# Patient Record
Sex: MALE | Race: WHITE | NOT HISPANIC OR LATINO
[De-identification: names, ages, dates, MRNs, and addresses within clinical notes are randomized per-mention and may not be internally consistent; named-entity substitution may affect disease eponyms.]

---

## 2017-09-21 ENCOUNTER — APPOINTMENT (OUTPATIENT)
Dept: DERMATOLOGY | Facility: CLINIC | Age: 77
End: 2017-09-21
Payer: MEDICARE

## 2017-09-21 PROCEDURE — 17003 DESTRUCT PREMALG LES 2-14: CPT

## 2017-09-21 PROCEDURE — 17281 DSTR MAL LS F/E/E/N/L/M .6-1: CPT | Mod: 59

## 2017-09-21 PROCEDURE — 17000 DESTRUCT PREMALG LESION: CPT

## 2017-09-21 PROCEDURE — 99214 OFFICE O/P EST MOD 30 MIN: CPT | Mod: 25

## 2018-01-23 ENCOUNTER — APPOINTMENT (OUTPATIENT)
Dept: DERMATOLOGY | Facility: CLINIC | Age: 78
End: 2018-01-23

## 2018-07-30 ENCOUNTER — APPOINTMENT (OUTPATIENT)
Dept: DERMATOLOGY | Facility: CLINIC | Age: 78
End: 2018-07-30
Payer: MEDICARE

## 2018-07-30 PROCEDURE — 99214 OFFICE O/P EST MOD 30 MIN: CPT | Mod: 25

## 2018-07-30 PROCEDURE — 17281 DSTR MAL LS F/E/E/N/L/M .6-1: CPT

## 2018-07-30 PROCEDURE — 17272 DSTR MAL LES S/N/H/F/G 1.1-2: CPT | Mod: 59

## 2018-07-30 PROCEDURE — 11100 BX SKIN SUBCUTANEOUS&/MUCOUS MEMBRANE 1 LESION: CPT | Mod: 59

## 2018-08-20 ENCOUNTER — APPOINTMENT (OUTPATIENT)
Dept: VASCULAR SURGERY | Facility: CLINIC | Age: 78
End: 2018-08-20
Payer: MEDICARE

## 2018-08-20 VITALS
OXYGEN SATURATION: 97 % | WEIGHT: 180 LBS | SYSTOLIC BLOOD PRESSURE: 151 MMHG | HEIGHT: 72 IN | HEART RATE: 61 BPM | DIASTOLIC BLOOD PRESSURE: 71 MMHG | TEMPERATURE: 97.9 F | BODY MASS INDEX: 24.38 KG/M2

## 2018-08-20 DIAGNOSIS — Z87.39 PERSONAL HISTORY OF OTHER DISEASES OF THE MUSCULOSKELETAL SYSTEM AND CONNECTIVE TISSUE: ICD-10-CM

## 2018-08-20 DIAGNOSIS — Z87.891 PERSONAL HISTORY OF NICOTINE DEPENDENCE: ICD-10-CM

## 2018-08-20 DIAGNOSIS — Z86.79 PERSONAL HISTORY OF OTHER DISEASES OF THE CIRCULATORY SYSTEM: ICD-10-CM

## 2018-08-20 DIAGNOSIS — Z82.49 FAMILY HISTORY OF ISCHEMIC HEART DISEASE AND OTHER DISEASES OF THE CIRCULATORY SYSTEM: ICD-10-CM

## 2018-08-20 DIAGNOSIS — Z82.3 FAMILY HISTORY OF STROKE: ICD-10-CM

## 2018-08-20 DIAGNOSIS — Z86.39 PERSONAL HISTORY OF OTHER ENDOCRINE, NUTRITIONAL AND METABOLIC DISEASE: ICD-10-CM

## 2018-08-20 DIAGNOSIS — Z95.0 PRESENCE OF CARDIAC PACEMAKER: ICD-10-CM

## 2018-08-20 DIAGNOSIS — Z87.448 PERSONAL HISTORY OF OTHER DISEASES OF URINARY SYSTEM: ICD-10-CM

## 2018-08-20 DIAGNOSIS — Z86.2 PERSONAL HISTORY OF DISEASES OF THE BLOOD AND BLOOD-FORMING ORGANS AND CERTAIN DISORDERS INVOLVING THE IMMUNE MECHANISM: ICD-10-CM

## 2018-08-20 DIAGNOSIS — Z85.828 PERSONAL HISTORY OF OTHER MALIGNANT NEOPLASM OF SKIN: ICD-10-CM

## 2018-08-20 DIAGNOSIS — Z82.61 FAMILY HISTORY OF ARTHRITIS: ICD-10-CM

## 2018-08-20 PROCEDURE — 99204 OFFICE O/P NEW MOD 45 MIN: CPT

## 2018-08-21 PROBLEM — Z87.39 HISTORY OF ARTHRITIS: Status: RESOLVED | Noted: 2018-08-20 | Resolved: 2018-08-21

## 2018-08-21 PROBLEM — Z87.448 HISTORY OF KIDNEY DISEASE: Status: RESOLVED | Noted: 2018-08-20 | Resolved: 2018-08-21

## 2018-08-21 PROBLEM — Z82.61 FAMILY HISTORY OF ARTHRITIS: Status: ACTIVE | Noted: 2018-08-20

## 2018-08-21 PROBLEM — Z86.2 HISTORY OF TTP (THROMBOTIC THROMBOCYTOPENIC PURPURA): Status: RESOLVED | Noted: 2018-08-20 | Resolved: 2018-08-21

## 2018-08-21 PROBLEM — Z87.891 FORMER SMOKER: Status: ACTIVE | Noted: 2018-08-20

## 2018-08-21 PROBLEM — Z82.49 FAMILY HISTORY OF HYPERTENSION: Status: ACTIVE | Noted: 2018-08-20

## 2018-08-21 PROBLEM — Z95.0 HISTORY OF CARDIAC PACEMAKER: Status: RESOLVED | Noted: 2018-08-20 | Resolved: 2018-08-21

## 2018-08-21 PROBLEM — Z85.828 HISTORY OF BASAL CELL CARCINOMA (BCC): Status: RESOLVED | Noted: 2018-08-20 | Resolved: 2018-08-21

## 2018-08-21 PROBLEM — Z82.3 FAMILY HISTORY OF CEREBROVASCULAR ACCIDENT (CVA): Status: ACTIVE | Noted: 2018-08-20

## 2018-08-21 PROBLEM — Z86.79 HISTORY OF HYPERTENSION: Status: RESOLVED | Noted: 2018-08-20 | Resolved: 2018-08-21

## 2018-08-21 PROBLEM — Z86.39 HISTORY OF DIABETES MELLITUS: Status: RESOLVED | Noted: 2018-08-20 | Resolved: 2018-08-21

## 2018-08-23 ENCOUNTER — APPOINTMENT (OUTPATIENT)
Dept: DERMATOLOGY | Facility: CLINIC | Age: 78
End: 2018-08-23
Payer: MEDICARE

## 2018-08-23 PROCEDURE — 99213 OFFICE O/P EST LOW 20 MIN: CPT

## 2018-08-24 ENCOUNTER — OUTPATIENT (OUTPATIENT)
Dept: OUTPATIENT SERVICES | Facility: HOSPITAL | Age: 78
LOS: 1 days | End: 2018-08-24
Payer: MEDICARE

## 2018-08-24 VITALS
TEMPERATURE: 97 F | RESPIRATION RATE: 18 BRPM | WEIGHT: 176.37 LBS | SYSTOLIC BLOOD PRESSURE: 152 MMHG | HEIGHT: 71 IN | DIASTOLIC BLOOD PRESSURE: 69 MMHG | HEART RATE: 62 BPM

## 2018-08-24 DIAGNOSIS — T82.898A OTHER SPECIFIED COMPLICATION OF VASCULAR PROSTHETIC DEVICES, IMPLANTS AND GRAFTS, INITIAL ENCOUNTER: ICD-10-CM

## 2018-08-24 DIAGNOSIS — C43.9 MALIGNANT MELANOMA OF SKIN, UNSPECIFIED: Chronic | ICD-10-CM

## 2018-08-24 DIAGNOSIS — I48.91 UNSPECIFIED ATRIAL FIBRILLATION: ICD-10-CM

## 2018-08-24 DIAGNOSIS — E11.9 TYPE 2 DIABETES MELLITUS WITHOUT COMPLICATIONS: ICD-10-CM

## 2018-08-24 DIAGNOSIS — Z29.9 ENCOUNTER FOR PROPHYLACTIC MEASURES, UNSPECIFIED: ICD-10-CM

## 2018-08-24 DIAGNOSIS — Z90.89 ACQUIRED ABSENCE OF OTHER ORGANS: Chronic | ICD-10-CM

## 2018-08-24 DIAGNOSIS — I77.0 ARTERIOVENOUS FISTULA, ACQUIRED: Chronic | ICD-10-CM

## 2018-08-24 DIAGNOSIS — Z95.0 PRESENCE OF CARDIAC PACEMAKER: Chronic | ICD-10-CM

## 2018-08-24 DIAGNOSIS — Z01.818 ENCOUNTER FOR OTHER PREPROCEDURAL EXAMINATION: ICD-10-CM

## 2018-08-24 LAB
ALBUMIN SERPL ELPH-MCNC: 4.4 G/DL — SIGNIFICANT CHANGE UP (ref 3.3–5.2)
ALP SERPL-CCNC: 132 U/L — HIGH (ref 40–120)
ALT FLD-CCNC: 10 U/L — SIGNIFICANT CHANGE UP
ANION GAP SERPL CALC-SCNC: 20 MMOL/L — HIGH (ref 5–17)
APTT BLD: 44.6 SEC — HIGH (ref 27.5–37.4)
AST SERPL-CCNC: 19 U/L — SIGNIFICANT CHANGE UP
BASOPHILS # BLD AUTO: 0 K/UL — SIGNIFICANT CHANGE UP (ref 0–0.2)
BASOPHILS NFR BLD AUTO: 0.2 % — SIGNIFICANT CHANGE UP (ref 0–2)
BILIRUB SERPL-MCNC: 0.7 MG/DL — SIGNIFICANT CHANGE UP (ref 0.4–2)
BLD GP AB SCN SERPL QL: SIGNIFICANT CHANGE UP
BUN SERPL-MCNC: 43 MG/DL — HIGH (ref 8–20)
CALCIUM SERPL-MCNC: 9.8 MG/DL — SIGNIFICANT CHANGE UP (ref 8.6–10.2)
CHLORIDE SERPL-SCNC: 95 MMOL/L — LOW (ref 98–107)
CO2 SERPL-SCNC: 28 MMOL/L — SIGNIFICANT CHANGE UP (ref 22–29)
CREAT SERPL-MCNC: 5.93 MG/DL — HIGH (ref 0.5–1.3)
EOSINOPHIL # BLD AUTO: 0.1 K/UL — SIGNIFICANT CHANGE UP (ref 0–0.5)
EOSINOPHIL NFR BLD AUTO: 1 % — SIGNIFICANT CHANGE UP (ref 0–5)
GLUCOSE SERPL-MCNC: 131 MG/DL — HIGH (ref 70–115)
HBA1C BLD-MCNC: 6 % — HIGH (ref 4–5.6)
HCT VFR BLD CALC: 35.8 % — LOW (ref 42–52)
HGB BLD-MCNC: 10.9 G/DL — LOW (ref 14–18)
INR BLD: 1.88 RATIO — HIGH (ref 0.88–1.16)
LYMPHOCYTES # BLD AUTO: 0.5 K/UL — LOW (ref 1–4.8)
LYMPHOCYTES # BLD AUTO: 5.6 % — LOW (ref 20–55)
MCHC RBC-ENTMCNC: 29.5 PG — SIGNIFICANT CHANGE UP (ref 27–31)
MCHC RBC-ENTMCNC: 30.4 G/DL — LOW (ref 32–36)
MCV RBC AUTO: 96.8 FL — HIGH (ref 80–94)
MONOCYTES # BLD AUTO: 0.9 K/UL — HIGH (ref 0–0.8)
MONOCYTES NFR BLD AUTO: 9.6 % — SIGNIFICANT CHANGE UP (ref 3–10)
NEUTROPHILS # BLD AUTO: 7.7 K/UL — SIGNIFICANT CHANGE UP (ref 1.8–8)
NEUTROPHILS NFR BLD AUTO: 83.4 % — HIGH (ref 37–73)
PLATELET # BLD AUTO: 156 K/UL — SIGNIFICANT CHANGE UP (ref 150–400)
POTASSIUM SERPL-MCNC: 4.2 MMOL/L — SIGNIFICANT CHANGE UP (ref 3.5–5.3)
POTASSIUM SERPL-SCNC: 4.2 MMOL/L — SIGNIFICANT CHANGE UP (ref 3.5–5.3)
PROT SERPL-MCNC: 7.3 G/DL — SIGNIFICANT CHANGE UP (ref 6.6–8.7)
PROTHROM AB SERPL-ACNC: 20.9 SEC — HIGH (ref 9.8–12.7)
RBC # BLD: 3.7 M/UL — LOW (ref 4.6–6.2)
RBC # FLD: 15.9 % — HIGH (ref 11–15.6)
SODIUM SERPL-SCNC: 143 MMOL/L — SIGNIFICANT CHANGE UP (ref 135–145)
TYPE + AB SCN PNL BLD: SIGNIFICANT CHANGE UP
WBC # BLD: 9.2 K/UL — SIGNIFICANT CHANGE UP (ref 4.8–10.8)
WBC # FLD AUTO: 9.2 K/UL — SIGNIFICANT CHANGE UP (ref 4.8–10.8)

## 2018-08-24 PROCEDURE — 85610 PROTHROMBIN TIME: CPT

## 2018-08-24 PROCEDURE — 86850 RBC ANTIBODY SCREEN: CPT

## 2018-08-24 PROCEDURE — G0463: CPT

## 2018-08-24 PROCEDURE — 80053 COMPREHEN METABOLIC PANEL: CPT

## 2018-08-24 PROCEDURE — 83036 HEMOGLOBIN GLYCOSYLATED A1C: CPT

## 2018-08-24 PROCEDURE — 86900 BLOOD TYPING SEROLOGIC ABO: CPT

## 2018-08-24 PROCEDURE — 86901 BLOOD TYPING SEROLOGIC RH(D): CPT

## 2018-08-24 PROCEDURE — 85027 COMPLETE CBC AUTOMATED: CPT

## 2018-08-24 PROCEDURE — 36415 COLL VENOUS BLD VENIPUNCTURE: CPT

## 2018-08-24 PROCEDURE — 85730 THROMBOPLASTIN TIME PARTIAL: CPT

## 2018-08-24 PROCEDURE — 93010 ELECTROCARDIOGRAM REPORT: CPT

## 2018-08-24 RX ORDER — CEFAZOLIN SODIUM 1 G
2000 VIAL (EA) INJECTION ONCE
Qty: 0 | Refills: 0 | Status: DISCONTINUED | OUTPATIENT
Start: 2018-08-29 | End: 2018-09-13

## 2018-08-24 RX ORDER — SODIUM CHLORIDE 9 MG/ML
3 INJECTION INTRAMUSCULAR; INTRAVENOUS; SUBCUTANEOUS ONCE
Qty: 0 | Refills: 0 | Status: DISCONTINUED | OUTPATIENT
Start: 2018-08-29 | End: 2018-08-29

## 2018-08-24 NOTE — H&P PST ADULT - NEGATIVE GENERAL GENITOURINARY SYMPTOMS
no incontinence/no flank pain L/no flank pain R/no urine discoloration/no bladder infections/no renal colic/no gas in urine/no hematuria

## 2018-08-24 NOTE — H&P PST ADULT - GASTROINTESTINAL DETAILS
soft/no distention/nontender/no guarding/no masses palpable/no rebound tenderness/no bruit/normal/bowel sounds normal/no rigidity/no organomegaly

## 2018-08-24 NOTE — H&P PST ADULT - PMH
Anemia, unspecified    Bradycardia  PPM 2015  CRF (chronic renal failure), stage 5  dialysis  tue thur Sat  H/O gastroesophageal reflux (GERD)    H/O: HTN (hypertension)    History of TTP (thrombotic thrombocytopenic purpura)    OA (osteoarthritis) of hip  left  Skin cancer (melanoma)  chest Anemia, unspecified    Atrial fibrillation, unspecified type  sick sinus syndrome  Bradycardia  PPM 2015  Cardiomyopathy    Chronic gout    CRF (chronic renal failure), stage 5  dialysis  tue thamy Sat  Diabetes mellitus    H/O gastroesophageal reflux (GERD)    H/O: HTN (hypertension)    History of TTP (thrombotic thrombocytopenic purpura)    OA (osteoarthritis) of hip  left  Skin cancer (melanoma)  chest

## 2018-08-24 NOTE — H&P PST ADULT - NEGATIVE GASTROINTESTINAL SYMPTOMS
no abdominal pain/no change in bowel habits/no flatulence/no steatorrhea/no vomiting/no diarrhea/no melena/no hematochezia/no jaundice/no hiccoughs/no constipation/no nausea

## 2018-08-24 NOTE — H&P PST ADULT - ASSESSMENT
pseudoaneurysm of left arm fistula   CAPRINI SCORE    AGE RELATED RISK FACTORS                                                       MOBILITY RELATED FACTORS  [ ] Age 41-60 years                                            (1 Point)                  [ ] Bed rest                                                        (1 Point)  [ ] Age: 61-74 years                                           (2 Points)                [ ] Plaster cast                                                   (2 Points)  [x ] Age= 75 years                                              (3 Points)                 [ ] Bed bound for more than 72 hours                   (2 Points)    DISEASE RELATED RISK FACTORS                                               GENDER SPECIFIC FACTORS  [ ] Edema in the lower extremities                       (1 Point)                  [ ] Pregnancy                                                     (1 Point)  [ ] Varicose veins                                               (1 Point)                  [ ] Post-partum < 6 weeks                                   (1 Point)             [ x] BMI > 25 Kg/m2                                            (1 Point)                  [ ] Hormonal therapy  or oral contraception            (1 Point)                 [ ] Sepsis (in the previous month)                        (1 Point)                  [ ] History of pregnancy complications  [ ] Pneumonia or serious lung disease                                               [ ] Unexplained or recurrent                       (1 Point)           (in the previous month)                               (1 Point)  [ ] Abnormal pulmonary function test                     (1 Point)                 SURGERY RELATED RISK FACTORS  [ ] Acute myocardial infarction                              (1 Point)                 [ ]  Section                                            (1 Point)  [ ] Congestive heart failure (in the previous month)  (1 Point)                 [ ] Minor surgery                                                 (1 Point)   [ ] Inflammatory bowel disease                             (1 Point)                 [ ] Arthroscopic surgery                                        (2 Points)  [ ] Central venous access                                    (2 Points)                [ x] General surgery lasting more than 45 minutes   (2 Points)       [ ] Stroke (in the previous month)                          (5 Points)               [ ] Elective arthroplasty                                        (5 Points)                                                                                                                                               HEMATOLOGY RELATED FACTORS                                                 TRAUMA RELATED RISK FACTORS  [ ] Prior episodes of VTE                                     (3 Points)                 [ ] Fracture of the hip, pelvis, or leg                       (5 Points)  [ ] Positive family history for VTE                         (3 Points)                 [ ] Acute spinal cord injury (in the previous month)  (5 Points)  [ ] Prothrombin 59888 A                                      (3 Points)                 [ ] Paralysis  (less than 1 month)                          (5 Points)  [ ] Factor V Leiden                                             (3 Points)                 [ ] Multiple Trauma within 1 month                         (5 Points)  [ ] Lupus anticoagulants                                     (3 Points)                                                           [ ] Anticardiolipin antibodies                                (3 Points)                                                       [ ] High homocysteine in the blood                      (3 Points)                                             [ ] Other congenital or acquired thrombophilia       (3 Points)                                                [ ] Heparin induced thrombocytopenia                  (3 Points)                                          Total Score [       7   ]

## 2018-08-24 NOTE — H&P PST ADULT - NEGATIVE PSYCHIATRIC SYMPTOMS
no auditory hallucinations/no hyperactivity/no anxiety/no insomnia/no paranoia/no agitation/no visual hallucinations/no memory loss/no suicidal ideation

## 2018-08-24 NOTE — H&P PST ADULT - NEGATIVE GENERAL SYMPTOMS
no malaise/no fever/no weight gain/no sweating/no anorexia/no polyphagia/no polyuria/no chills/no weight loss/no polydipsia/no fatigue

## 2018-08-24 NOTE — H&P PST ADULT - NSANTHOSAYNRD_GEN_A_CORE
No. JARRET screening performed.  STOP BANG Legend: 0-2 = LOW Risk; 3-4 = INTERMEDIATE Risk; 5-8 = HIGH Risk

## 2018-08-24 NOTE — H&P PST ADULT - MUSCULOSKELETAL
details… detailed exam left hip/normal/ROM intact/decreased ROM due to pain/no joint erythema/no joint swelling/no joint warmth/no calf tenderness

## 2018-08-24 NOTE — H&P PST ADULT - NEGATIVE MUSCULOSKELETAL SYMPTOMS
no leg pain R/no arthralgia/no muscle weakness/no myalgia/no muscle cramps/no back pain/no leg pain L/no neck pain/no joint swelling/no arm pain L/no arm pain R

## 2018-08-24 NOTE — H&P PST ADULT - NEGATIVE OPHTHALMOLOGIC SYMPTOMS
no diplopia/no photophobia/no irritation L/no scleral injection R/no blurred vision L/no discharge R/no pain L/no irritation R/no loss of vision R/no pain R/no blurred vision R/no loss of vision L/no lacrimation L/no lacrimation R/no scleral injection L/no discharge L

## 2018-08-24 NOTE — H&P PST ADULT - NEGATIVE CARDIOVASCULAR SYMPTOMS
no orthopnea/no claudication/no dyspnea on exertion/no paroxysmal nocturnal dyspnea/no chest pain/no palpitations

## 2018-08-24 NOTE — H&P PST ADULT - NEGATIVE NEUROLOGICAL SYMPTOMS
no paresthesias/no syncope/no hemiparesis/no tremors/no vertigo/no loss of sensation/no headache/no facial palsy/no loss of consciousness/no transient paralysis/no weakness/no confusion/no generalized seizures/no focal seizures

## 2018-08-24 NOTE — H&P PST ADULT - NEGATIVE ENMT SYMPTOMS
no gum bleeding/no hearing difficulty/no nasal congestion/no vertigo/no sinus symptoms/no ear pain/no recurrent cold sores/no throat pain/no nasal discharge/no post-nasal discharge/no nose bleeds/no dry mouth/no dysphagia/no nasal obstruction/no abnormal taste sensation/no tinnitus

## 2018-08-24 NOTE — H&P PST ADULT - EXTREMITIES COMMENTS
left foot with decreased pulses, left arm av fistula + trill   aneurysm  above left wrist plum size + pulses

## 2018-08-24 NOTE — H&P PST ADULT - NEUROLOGICAL DETAILS
responds to pain/superficial reflexes intact/alert and oriented x 3/no spontaneous movement/responds to verbal commands/sensation intact/deep reflexes intact/cranial nerves intact

## 2018-08-24 NOTE — H&P PST ADULT - CENTRAL VENOUS CATHETER
[facility] [place]      NAME: Argneis Peña  AGE: 81 y o  SEX: male  : 1934   MRN: [id number]    DATE: 7/3/2018  TIME: 5:03 PM    Assessment and Plan   1  Essential hypertension  Well controlled  - Comprehensive metabolic panel; Future    2  Coronary artery disease involving native coronary artery of native heart without angina pectoris  No angina  On antiplatelet therapy and appropriate risk factor modification  3  Polymyalgia rheumatica (HCC)  Slowly tapering prednisone  His C reactive protein and sed rate will be repeated in the near future  - PredniSONE 1 MG TBEC; Take 2 tablets (2 mg total) by mouth daily  Dispense: 60 tablet; Refill: 1  - CBC; Future  - C-reactive protein; Future  - Sedimentation rate, automated; Future    4  Hyperlipidemia, unspecified hyperlipidemia type  On Vytorin  - Lipid panel; Future    5  Idiopathic chronic gout without tophus, unspecified site  Currently asymptomatic  On allopurinol    The patient is doing well overall  He has been slowly tapering prednisone  He is now on 2 mg and thinks that his symptoms may be worsening  I asked him to repeat lab work when he is off prednisone or when he has to stop his taper because of her current symptoms  Overall, he appears to be doing well and will maintain his current medications  Return to office in:  3 months    Chief Complaint     Chief Complaint   Patient presents with    Follow-up     has glaucoma diagnosis, uses eye drops, laser eye surgery left recently       History of Present Illness     The patient returned to the office for re-evaluation of hypertension, coronary artery disease, hyperlipidemia, gout, and polymyalgia rheumatica  Since his last visit he has been doing well  He spent the winter in [place]  He saw a rheumatologist there who agreed with the diagnosis of polymyalgia  The patient has been tapering his prednisone by 1 mg per month  He is currently on 2 mg daily    He thinks that his hips are hurting more since he reduced his dose from 3 mg  He says this is not severe but he did notice the difference  He has otherwise been well with no chest pain, shortness of breath, palpitations, or dizziness  He has no issues with his medications  The following portions of the patient's history were reviewed and updated as appropriate: allergies, current medications, past family history, past medical history, past social history, past surgical history and problem list     Review of Systems   Review of Systems   Constitutional: Negative  HENT: Negative for congestion, ear pain, postnasal drip, rhinorrhea, sore throat and trouble swallowing  Eyes: Negative for pain, discharge, redness and visual disturbance  Respiratory: Negative for cough, shortness of breath and wheezing  Cardiovascular: Negative  Gastrointestinal: Negative  Endocrine: Negative  Genitourinary: Negative for difficulty urinating, dysuria, frequency, hematuria and urgency  Musculoskeletal: Positive for arthralgias  Negative for gait problem, joint swelling and myalgias  Skin: Negative for rash  Neurological: Negative for dizziness, speech difficulty, weakness, light-headedness, numbness and headaches  Hematological: Negative  Psychiatric/Behavioral: Negative for confusion, decreased concentration, dysphoric mood and sleep disturbance  The patient is not nervous/anxious  Active Problem List     Patient Active Problem List   Diagnosis    Polymyalgia rheumatica (HCC)    Hypertension    Hyperlipidemia    Gout    CAD (coronary artery disease)       Objective   /80 (BP Location: Left arm, Patient Position: Sitting, Cuff Size: Standard)   Pulse 86 Comment: irregular  Temp 97 6 °F (36 4 °C) (Tympanic)   Resp 16   Ht 5' 8" (1 727 m)   Wt 77 6 kg (171 lb)   BMI 26 00 kg/m²     Physical Exam   Constitutional: He is oriented to person, place, and time   He appears well-developed and well-nourished  No distress  HENT:   Head: Normocephalic and atraumatic  Neck: Neck supple  No JVD present  No tracheal deviation present  No thyromegaly present  Cardiovascular: Normal rate, regular rhythm, normal heart sounds and intact distal pulses  Exam reveals no gallop and no friction rub  No murmur heard  Pulmonary/Chest: Effort normal and breath sounds normal  He has no wheezes  He has no rales  He exhibits no tenderness  Abdominal: Soft  Bowel sounds are normal  He exhibits no distension and no mass  There is no tenderness  There is no rebound  Musculoskeletal: Normal range of motion  He exhibits no edema or tenderness  Lymphadenopathy:     He has no cervical adenopathy  Neurological: He is alert and oriented to person, place, and time  Skin: Skin is warm and dry  Psychiatric: He has a normal mood and affect   His behavior is normal  Judgment and thought content normal        Pertinent Laboratory/Diagnostic Studies:  No recent lab work        Current Medications     Current Outpatient Prescriptions:     allopurinol (ZYLOPRIM) 300 mg tablet, Take 1 tablet by mouth daily, Disp: , Rfl:     amLODIPine (NORVASC) 5 mg tablet, Take 1 tablet by mouth daily, Disp: , Rfl:     aspirin 81 MG tablet, Take 1 tablet by mouth daily, Disp: , Rfl:     Ergocalciferol (VITAMIN D2) 2000 units TABS, Take 1 tablet by mouth daily, Disp: , Rfl:     ezetimibe-simvastatin (VYTORIN) 10-40 mg per tablet, Take 1 tablet by mouth daily, Disp: , Rfl:     Misc Natural Products (GLUCOSAMINE CHOND COMPLEX/MSM PO), Take by mouth daily, Disp: , Rfl:     Multiple Vitamins-Minerals (MULTIVITAMIN ADULT PO), Take 1 tablet by mouth daily, Disp: , Rfl:     PredniSONE 1 MG TBEC, Take 2 tablets (2 mg total) by mouth daily, Disp: 60 tablet, Rfl: 1      Amber Galaviz MD no

## 2018-08-24 NOTE — H&P PST ADULT - RS GEN PE MLT RESP DETAILS PC
no rhonchi/no wheezes/normal/clear to auscultation bilaterally/no subcutaneous emphysema/no chest wall tenderness/good air movement/no intercostal retractions/no rales/respirations non-labored/airway patent/breath sounds equal

## 2018-08-24 NOTE — H&P PST ADULT - PSH
A-V fistula  2010  Malignant melanoma  removed  Pacemaker    S/P tonsillectomy A-V fistula  2010 left  Malignant melanoma  removed  Pacemaker    S/P tonsillectomy

## 2018-08-24 NOTE — H&P PST ADULT - NEGATIVE SKIN SYMPTOMS
no change in size/color of mole/no pitted nails/no brittle nails/no tumor/no hair loss/no rash/no itching/no dryness

## 2018-08-24 NOTE — H&P PST ADULT - HISTORY OF PRESENT ILLNESS
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

## 2018-08-25 PROBLEM — N18.5 CHRONIC KIDNEY DISEASE, STAGE 5: Chronic | Status: ACTIVE | Noted: 2018-08-24

## 2018-08-25 PROBLEM — C43.9 MALIGNANT MELANOMA OF SKIN, UNSPECIFIED: Chronic | Status: ACTIVE | Noted: 2018-08-24

## 2018-08-25 PROBLEM — I48.91 UNSPECIFIED ATRIAL FIBRILLATION: Chronic | Status: ACTIVE | Noted: 2018-08-24

## 2018-08-29 ENCOUNTER — OUTPATIENT (OUTPATIENT)
Dept: OUTPATIENT SERVICES | Facility: HOSPITAL | Age: 78
LOS: 1 days | End: 2018-08-29
Payer: MEDICARE

## 2018-08-29 ENCOUNTER — RESULT REVIEW (OUTPATIENT)
Age: 78
End: 2018-08-29

## 2018-08-29 VITALS
OXYGEN SATURATION: 98 % | DIASTOLIC BLOOD PRESSURE: 66 MMHG | HEART RATE: 63 BPM | SYSTOLIC BLOOD PRESSURE: 145 MMHG | RESPIRATION RATE: 12 BRPM

## 2018-08-29 VITALS
WEIGHT: 179.9 LBS | RESPIRATION RATE: 16 BRPM | TEMPERATURE: 98 F | HEART RATE: 66 BPM | DIASTOLIC BLOOD PRESSURE: 55 MMHG | SYSTOLIC BLOOD PRESSURE: 128 MMHG | OXYGEN SATURATION: 97 %

## 2018-08-29 DIAGNOSIS — Z90.89 ACQUIRED ABSENCE OF OTHER ORGANS: Chronic | ICD-10-CM

## 2018-08-29 DIAGNOSIS — Z95.0 PRESENCE OF CARDIAC PACEMAKER: Chronic | ICD-10-CM

## 2018-08-29 DIAGNOSIS — C43.9 MALIGNANT MELANOMA OF SKIN, UNSPECIFIED: Chronic | ICD-10-CM

## 2018-08-29 DIAGNOSIS — I77.0 ARTERIOVENOUS FISTULA, ACQUIRED: Chronic | ICD-10-CM

## 2018-08-29 DIAGNOSIS — T82.898A OTHER SPECIFIED COMPLICATION OF VASCULAR PROSTHETIC DEVICES, IMPLANTS AND GRAFTS, INITIAL ENCOUNTER: ICD-10-CM

## 2018-08-29 LAB
ABO RH CONFIRMATION: SIGNIFICANT CHANGE UP
ANION GAP SERPL CALC-SCNC: 20 MMOL/L — HIGH (ref 5–17)
APTT BLD: 41.5 SEC — HIGH (ref 27.5–37.4)
BUN SERPL-MCNC: 47 MG/DL — HIGH (ref 8–20)
CALCIUM SERPL-MCNC: 9.7 MG/DL — SIGNIFICANT CHANGE UP (ref 8.6–10.2)
CHLORIDE SERPL-SCNC: 96 MMOL/L — LOW (ref 98–107)
CO2 SERPL-SCNC: 27 MMOL/L — SIGNIFICANT CHANGE UP (ref 22–29)
CREAT SERPL-MCNC: 7.12 MG/DL — HIGH (ref 0.5–1.3)
GLUCOSE SERPL-MCNC: 148 MG/DL — HIGH (ref 70–115)
INR BLD: 1.74 RATIO — HIGH (ref 0.88–1.16)
POTASSIUM SERPL-MCNC: 4 MMOL/L — SIGNIFICANT CHANGE UP (ref 3.5–5.3)
POTASSIUM SERPL-SCNC: 4 MMOL/L — SIGNIFICANT CHANGE UP (ref 3.5–5.3)
PROTHROM AB SERPL-ACNC: 19.4 SEC — HIGH (ref 9.8–12.7)
SODIUM SERPL-SCNC: 143 MMOL/L — SIGNIFICANT CHANGE UP (ref 135–145)

## 2018-08-29 PROCEDURE — 36832 AV FISTULA REVISION OPEN: CPT | Mod: LT

## 2018-08-29 PROCEDURE — 80048 BASIC METABOLIC PNL TOTAL CA: CPT

## 2018-08-29 PROCEDURE — 85730 THROMBOPLASTIN TIME PARTIAL: CPT

## 2018-08-29 PROCEDURE — 88304 TISSUE EXAM BY PATHOLOGIST: CPT | Mod: 26

## 2018-08-29 PROCEDURE — 36832 AV FISTULA REVISION OPEN: CPT

## 2018-08-29 PROCEDURE — 36832 AV FISTULA REVISION OPEN: CPT | Mod: AS,LT

## 2018-08-29 PROCEDURE — 88304 TISSUE EXAM BY PATHOLOGIST: CPT

## 2018-08-29 PROCEDURE — 82962 GLUCOSE BLOOD TEST: CPT

## 2018-08-29 PROCEDURE — 85610 PROTHROMBIN TIME: CPT

## 2018-08-29 PROCEDURE — 36415 COLL VENOUS BLD VENIPUNCTURE: CPT

## 2018-08-29 RX ORDER — IBUPROFEN 200 MG
1 TABLET ORAL
Qty: 20 | Refills: 0 | OUTPATIENT
Start: 2018-08-29 | End: 2018-09-02

## 2018-08-29 NOTE — ASU DISCHARGE PLAN (ADULT/PEDIATRIC). - MEDICATION SUMMARY - MEDICATIONS TO TAKE
I will START or STAY ON the medications listed below when I get home from the hospital:    ibuprofen 600 mg oral tablet  -- 1 tab(s) by mouth every 6 hours   -- Do not take this drug if you are pregnant.  It is very important that you take or use this exactly as directed.  Do not skip doses or discontinue unless directed by your doctor.  May cause drowsiness or dizziness.  Obtain medical advice before taking any non-prescription drugs as some may affect the action of this medication.  Take with food or milk.    -- Indication: For surgical site pain     enalapril 20 mg oral tablet  -- 1 tab(s) by mouth 2 times a day  -- Indication: For home med, continue per pmd supervision     prazosin 1 mg oral capsule  -- 1 cap(s) by mouth 3 times a day  -- Indication: For home med, continue per pmd supervision    isosorbide mononitrate 60 mg oral tablet, extended release  -- 1 tab(s) by mouth once a day (in the morning)  -- Indication: For home med, continue per pmd supervision    amiodarone 200 mg oral tablet  -- 1 tab(s) by mouth once a day  -- Indication: For home med, continue per pmd supervision    warfarin 2 mg oral tablet  -- 1 tab(s) by mouth once a day  -- Indication: For home med, continue per pmd supervision, restart 8/29    NovoLOG FlexPen 100 units/mL injectable solution  -- 3 unit(s) injectable 3 times a day  -- Indication: For home med, continue per pmd supervision    Lantus Solostar Pen 100 units/mL subcutaneous solution  -- 8  subcutaneous once a day (at bedtime)  -- Indication: For home med, continue per pmd supervision    allopurinol 100 mg oral tablet  -- 1 tab(s) by mouth once a day  -- Indication: For home med, continue per pmd supervision    amLODIPine 10 mg oral tablet  -- 1 tab(s) by mouth once a day  -- Indication: For home med, continue per pmd supervision    furosemide 80 mg oral tablet  -- 1 tab(s) by mouth once a day  -- Indication: For home med, continue per pmd supervision    raNITIdine 150 mg oral capsule  -- 1 cap(s) by mouth 2 times a day  -- Indication: For home med, continue per pmd supervision    Cinnamon 500 mg oral capsule  -- 1 cap(s) by mouth once a day  -- Indication: For home med, continue per pmd supervision    Iron-150 oral tablet  -- 1 tab(s) by mouth once a day  -- Indication: For home med, continue per pmd supervision    CoQ10 300 mg oral capsule  -- 1 cap(s) by mouth once a day  -- Indication: For home med, continue per pmd supervision    glucosamine 500 mg oral tablet  -- 2 tab(s) by mouth once a day  -- Indication: For home med, continue per pmd supervision    sevelamer carbonate 800 mg oral tablet  -- 1 tab(s) by mouth 3 times a day (with meals)  -- Indication: For home med, continue per pmd supervision    folic acid 0.8 mg oral tablet  -- 1 tab(s) by mouth once a day  -- Indication: For home med, continue per pmd supervision

## 2018-08-29 NOTE — BRIEF OPERATIVE NOTE - PROCEDURE
<<-----Click on this checkbox to enter Procedure Arteriovenous fistula repair  08/29/2018  aneurysmal sac resection  Active  JARED

## 2018-08-29 NOTE — BRIEF OPERATIVE NOTE - PRE-OP DX
ESRD (end stage renal disease) on dialysis  08/29/2018  aneurysmal seferino avf  Active  Cirilo Iverson

## 2018-08-29 NOTE — ASU DISCHARGE PLAN (ADULT/PEDIATRIC). - SPECIAL INSTRUCTIONS
Patient is to access AVF tomorrow (8/30).  If there are any issues with access, patient is to notify the office.

## 2018-08-29 NOTE — ASU DISCHARGE PLAN (ADULT/PEDIATRIC). - NOTIFY
Swelling that continues/Numbness, color, or temperature change to extremity/Bleeding that does not stop

## 2018-08-29 NOTE — BRIEF OPERATIVE NOTE - POST-OP DX
ESRD (end stage renal disease) on dialysis  08/29/2018  aneurysmal cephalic outflow from seferino avf  Active  Cirilo Iverson

## 2018-09-04 ENCOUNTER — APPOINTMENT (OUTPATIENT)
Dept: VASCULAR SURGERY | Facility: CLINIC | Age: 78
End: 2018-09-04
Payer: MEDICARE

## 2018-09-04 VITALS
OXYGEN SATURATION: 98 % | TEMPERATURE: 97.4 F | HEIGHT: 72 IN | SYSTOLIC BLOOD PRESSURE: 147 MMHG | DIASTOLIC BLOOD PRESSURE: 66 MMHG | HEART RATE: 63 BPM

## 2018-09-04 DIAGNOSIS — I77.0 ARTERIOVENOUS FISTULA, ACQUIRED: ICD-10-CM

## 2018-09-04 PROBLEM — I42.9 CARDIOMYOPATHY, UNSPECIFIED: Chronic | Status: ACTIVE | Noted: 2018-08-24

## 2018-09-04 PROBLEM — D64.9 ANEMIA, UNSPECIFIED: Chronic | Status: ACTIVE | Noted: 2018-08-24

## 2018-09-04 PROBLEM — M16.9 OSTEOARTHRITIS OF HIP, UNSPECIFIED: Chronic | Status: ACTIVE | Noted: 2018-08-24

## 2018-09-04 PROBLEM — R00.1 BRADYCARDIA, UNSPECIFIED: Chronic | Status: ACTIVE | Noted: 2018-08-24

## 2018-09-04 PROBLEM — Z86.79 PERSONAL HISTORY OF OTHER DISEASES OF THE CIRCULATORY SYSTEM: Chronic | Status: ACTIVE | Noted: 2018-08-24

## 2018-09-04 PROBLEM — E11.9 TYPE 2 DIABETES MELLITUS WITHOUT COMPLICATIONS: Chronic | Status: ACTIVE | Noted: 2018-08-24

## 2018-09-04 PROBLEM — Z87.19 PERSONAL HISTORY OF OTHER DISEASES OF THE DIGESTIVE SYSTEM: Chronic | Status: ACTIVE | Noted: 2018-08-24

## 2018-09-04 PROBLEM — M1A.9XX0 CHRONIC GOUT, UNSPECIFIED, WITHOUT TOPHUS (TOPHI): Chronic | Status: ACTIVE | Noted: 2018-08-24

## 2018-09-04 PROBLEM — Z86.2 PERSONAL HISTORY OF DISEASES OF THE BLOOD AND BLOOD-FORMING ORGANS AND CERTAIN DISORDERS INVOLVING THE IMMUNE MECHANISM: Chronic | Status: ACTIVE | Noted: 2018-08-24

## 2018-09-04 PROCEDURE — 99024 POSTOP FOLLOW-UP VISIT: CPT

## 2018-09-04 PROCEDURE — 93926 LOWER EXTREMITY STUDY: CPT

## 2018-09-11 ENCOUNTER — APPOINTMENT (OUTPATIENT)
Dept: VASCULAR SURGERY | Facility: CLINIC | Age: 78
End: 2018-09-11
Payer: MEDICARE

## 2018-09-11 VITALS
HEART RATE: 62 BPM | SYSTOLIC BLOOD PRESSURE: 128 MMHG | DIASTOLIC BLOOD PRESSURE: 63 MMHG | TEMPERATURE: 97.6 F | OXYGEN SATURATION: 100 %

## 2018-09-11 PROCEDURE — 99024 POSTOP FOLLOW-UP VISIT: CPT

## 2018-09-11 RX ORDER — FOLIC ACID 20 MG
CAPSULE ORAL
Refills: 0 | Status: ACTIVE | COMMUNITY

## 2018-09-11 RX ORDER — AMLODIPINE BESYLATE 10 MG/1
10 TABLET ORAL
Refills: 0 | Status: ACTIVE | COMMUNITY

## 2018-09-11 RX ORDER — SEVELAMER CARBONATE 800 MG/1
800 TABLET, FILM COATED ORAL
Refills: 0 | Status: ACTIVE | COMMUNITY

## 2018-09-11 RX ORDER — INSULIN ASPART 100 [IU]/ML
100 INJECTION, SOLUTION INTRAVENOUS; SUBCUTANEOUS
Refills: 0 | Status: ACTIVE | COMMUNITY

## 2018-09-11 RX ORDER — GLUC/MSM/COLGN2/HYAL/ANTIARTH3 375-375-20
TABLET ORAL
Refills: 0 | Status: ACTIVE | COMMUNITY

## 2018-09-11 RX ORDER — UBIDECARENONE 100 MG
100 CAPSULE ORAL
Refills: 0 | Status: ACTIVE | COMMUNITY

## 2018-09-11 RX ORDER — INSULIN GLARGINE 100 [IU]/ML
INJECTION, SOLUTION SUBCUTANEOUS
Refills: 0 | Status: ACTIVE | COMMUNITY

## 2018-09-11 RX ORDER — CINNAMON BARK 500 MG
500 CAPSULE ORAL
Refills: 0 | Status: ACTIVE | COMMUNITY

## 2018-09-11 RX ORDER — ALLOPURINOL 100 MG/1
100 TABLET ORAL
Refills: 0 | Status: ACTIVE | COMMUNITY

## 2018-09-11 RX ORDER — BIOTIN 10 MG
500 TABLET ORAL
Refills: 0 | Status: ACTIVE | COMMUNITY

## 2018-09-11 RX ORDER — AMIODARONE HYDROCHLORIDE 200 MG/1
200 TABLET ORAL
Refills: 0 | Status: ACTIVE | COMMUNITY

## 2018-09-11 RX ORDER — ENALAPRIL MALEATE 20 MG/1
20 TABLET ORAL
Refills: 0 | Status: ACTIVE | COMMUNITY

## 2018-09-11 RX ORDER — UBIDECARENONE 30 MG
CAPSULE ORAL
Refills: 0 | Status: ACTIVE | COMMUNITY

## 2018-09-11 RX ORDER — FUROSEMIDE 80 MG/1
80 TABLET ORAL
Refills: 0 | Status: ACTIVE | COMMUNITY

## 2018-09-11 RX ORDER — PRAZOSIN HYDROCHLORIDE 1 MG/1
1 CAPSULE ORAL
Refills: 0 | Status: ACTIVE | COMMUNITY

## 2018-09-11 RX ORDER — RANITIDINE HYDROCHLORIDE 150 MG/1
150 CAPSULE ORAL
Refills: 0 | Status: ACTIVE | COMMUNITY

## 2018-09-11 RX ORDER — WARFARIN 2.5 MG/1
2.5 TABLET ORAL
Refills: 0 | Status: ACTIVE | COMMUNITY

## 2018-09-11 RX ORDER — ISOSORBIDE MONONITRATE 60 MG/1
60 TABLET, EXTENDED RELEASE ORAL
Refills: 0 | Status: ACTIVE | COMMUNITY

## 2018-09-11 RX ORDER — GLUCOSAMINE/METHYLSULFONYLMETH 500-400 MG
500-250-250 CAPSULE ORAL
Refills: 0 | Status: ACTIVE | COMMUNITY

## 2018-09-18 ENCOUNTER — APPOINTMENT (OUTPATIENT)
Dept: VASCULAR SURGERY | Facility: CLINIC | Age: 78
End: 2018-09-18
Payer: MEDICARE

## 2018-09-18 VITALS
OXYGEN SATURATION: 98 % | DIASTOLIC BLOOD PRESSURE: 73 MMHG | TEMPERATURE: 98.4 F | HEART RATE: 60 BPM | SYSTOLIC BLOOD PRESSURE: 151 MMHG | WEIGHT: 180 LBS | HEIGHT: 76 IN | BODY MASS INDEX: 21.92 KG/M2

## 2018-09-18 DIAGNOSIS — I72.9 ANEURYSM OF UNSPECIFIED SITE: ICD-10-CM

## 2018-09-18 PROCEDURE — 93931 UPPER EXTREMITY STUDY: CPT

## 2018-09-18 PROCEDURE — 99024 POSTOP FOLLOW-UP VISIT: CPT

## 2018-09-21 ENCOUNTER — RESULT REVIEW (OUTPATIENT)
Age: 78
End: 2018-09-21

## 2018-09-21 ENCOUNTER — OUTPATIENT (OUTPATIENT)
Dept: OUTPATIENT SERVICES | Facility: HOSPITAL | Age: 78
LOS: 1 days | Discharge: HOME | End: 2018-09-21
Payer: MEDICARE

## 2018-09-21 ENCOUNTER — OUTPATIENT (OUTPATIENT)
Dept: OUTPATIENT SERVICES | Facility: HOSPITAL | Age: 78
LOS: 1 days | End: 2018-09-21

## 2018-09-21 ENCOUNTER — APPOINTMENT (OUTPATIENT)
Dept: INTERVENTIONAL RADIOLOGY/VASCULAR | Facility: CLINIC | Age: 78
End: 2018-09-21
Payer: MEDICARE

## 2018-09-21 DIAGNOSIS — C43.9 MALIGNANT MELANOMA OF SKIN, UNSPECIFIED: Chronic | ICD-10-CM

## 2018-09-21 DIAGNOSIS — Z90.89 ACQUIRED ABSENCE OF OTHER ORGANS: Chronic | ICD-10-CM

## 2018-09-21 DIAGNOSIS — N18.6 END STAGE RENAL DISEASE: ICD-10-CM

## 2018-09-21 DIAGNOSIS — Z95.0 PRESENCE OF CARDIAC PACEMAKER: Chronic | ICD-10-CM

## 2018-09-21 DIAGNOSIS — D64.9 ANEMIA, UNSPECIFIED: ICD-10-CM

## 2018-09-21 DIAGNOSIS — I77.0 ARTERIOVENOUS FISTULA, ACQUIRED: Chronic | ICD-10-CM

## 2018-09-21 PROCEDURE — 36901 INTRO CATH DIALYSIS CIRCUIT: CPT

## 2018-09-21 PROCEDURE — 76937 US GUIDE VASCULAR ACCESS: CPT | Mod: 26

## 2018-09-21 PROCEDURE — 77001 FLUOROGUIDE FOR VEIN DEVICE: CPT | Mod: 26,XU

## 2018-09-21 PROCEDURE — 36901 INTRO CATH DIALYSIS CIRCUIT: CPT | Mod: 78

## 2018-09-21 PROCEDURE — 76937 US GUIDE VASCULAR ACCESS: CPT | Mod: 26,76

## 2018-09-21 PROCEDURE — 36558 INSERT TUNNELED CV CATH: CPT

## 2018-09-27 ENCOUNTER — APPOINTMENT (OUTPATIENT)
Dept: DERMATOLOGY | Facility: CLINIC | Age: 78
End: 2018-09-27

## 2018-10-02 ENCOUNTER — APPOINTMENT (OUTPATIENT)
Dept: VASCULAR SURGERY | Facility: CLINIC | Age: 78
End: 2018-10-02
Payer: MEDICARE

## 2018-10-02 VITALS
HEART RATE: 82 BPM | DIASTOLIC BLOOD PRESSURE: 70 MMHG | OXYGEN SATURATION: 97 % | TEMPERATURE: 98.3 F | SYSTOLIC BLOOD PRESSURE: 135 MMHG

## 2018-10-02 DIAGNOSIS — Z99.2 PRESENCE OF OTHER VASCULAR IMPLANTS AND GRAFTS: ICD-10-CM

## 2018-10-02 DIAGNOSIS — Z95.828 PRESENCE OF OTHER VASCULAR IMPLANTS AND GRAFTS: ICD-10-CM

## 2018-10-02 PROCEDURE — 99024 POSTOP FOLLOW-UP VISIT: CPT

## 2018-10-03 VITALS
WEIGHT: 179.9 LBS | HEIGHT: 76 IN | TEMPERATURE: 98 F | DIASTOLIC BLOOD PRESSURE: 70 MMHG | HEART RATE: 82 BPM | SYSTOLIC BLOOD PRESSURE: 135 MMHG | RESPIRATION RATE: 16 BRPM | OXYGEN SATURATION: 97 %

## 2018-10-03 NOTE — H&P PST ADULT - HISTORY OF PRESENT ILLNESS
77 y/o male ESRD on HD has aneurysmal L AVF that has been having difficulty with HD.  He had a [facility] PermCath placed after Fistulogram 9/21/18. He continues on HD.

## 2018-10-03 NOTE — H&P PST ADULT - ASSESSMENT
79 y/o male with aneurysmal L AVF that is non-functioning.  Currently using [facility] for HD.  Admit for L AVF revision.

## 2018-10-03 NOTE — ASU PATIENT PROFILE, ADULT - PMH
Anemia, unspecified    Atrial fibrillation, unspecified type  sick sinus syndrome  Bradycardia  PPM 2015  Cardiomyopathy    Chronic gout    CRF (chronic renal failure), stage 5  dialysis  tue thamy Sat  Diabetes mellitus    H/O gastroesophageal reflux (GERD)    H/O: HTN (hypertension)    History of TTP (thrombotic thrombocytopenic purpura)    OA (osteoarthritis) of hip  left  Skin cancer (melanoma)  chest

## 2018-10-09 RX ORDER — SODIUM CHLORIDE 9 MG/ML
3 INJECTION INTRAMUSCULAR; INTRAVENOUS; SUBCUTANEOUS ONCE
Qty: 0 | Refills: 0 | Status: DISCONTINUED | OUTPATIENT
Start: 2018-10-11 | End: 2018-10-26

## 2018-10-10 ENCOUNTER — TRANSCRIPTION ENCOUNTER (OUTPATIENT)
Age: 78
End: 2018-10-10

## 2018-10-11 ENCOUNTER — OUTPATIENT (OUTPATIENT)
Dept: OUTPATIENT SERVICES | Facility: HOSPITAL | Age: 78
LOS: 1 days | End: 2018-10-11
Payer: MEDICARE

## 2018-10-11 ENCOUNTER — RESULT REVIEW (OUTPATIENT)
Age: 78
End: 2018-10-11

## 2018-10-11 VITALS
HEART RATE: 66 BPM | SYSTOLIC BLOOD PRESSURE: 105 MMHG | RESPIRATION RATE: 18 BRPM | OXYGEN SATURATION: 100 % | DIASTOLIC BLOOD PRESSURE: 45 MMHG

## 2018-10-11 DIAGNOSIS — I77.0 ARTERIOVENOUS FISTULA, ACQUIRED: Chronic | ICD-10-CM

## 2018-10-11 DIAGNOSIS — Z95.0 PRESENCE OF CARDIAC PACEMAKER: Chronic | ICD-10-CM

## 2018-10-11 DIAGNOSIS — Z90.89 ACQUIRED ABSENCE OF OTHER ORGANS: Chronic | ICD-10-CM

## 2018-10-11 DIAGNOSIS — C43.9 MALIGNANT MELANOMA OF SKIN, UNSPECIFIED: Chronic | ICD-10-CM

## 2018-10-11 DIAGNOSIS — N18.6 END STAGE RENAL DISEASE: ICD-10-CM

## 2018-10-11 DIAGNOSIS — I77.0 ARTERIOVENOUS FISTULA, ACQUIRED: ICD-10-CM

## 2018-10-11 LAB
APTT BLD: 41.5 SEC — HIGH (ref 27.5–37.4)
INR BLD: 1.66 RATIO — HIGH (ref 0.88–1.16)
PROTHROM AB SERPL-ACNC: 18.5 SEC — HIGH (ref 9.8–12.7)

## 2018-10-11 PROCEDURE — 93005 ELECTROCARDIOGRAM TRACING: CPT

## 2018-10-11 PROCEDURE — 85730 THROMBOPLASTIN TIME PARTIAL: CPT

## 2018-10-11 PROCEDURE — 82962 GLUCOSE BLOOD TEST: CPT

## 2018-10-11 PROCEDURE — 36832 AV FISTULA REVISION OPEN: CPT

## 2018-10-11 PROCEDURE — 85610 PROTHROMBIN TIME: CPT

## 2018-10-11 PROCEDURE — 36832 AV FISTULA REVISION OPEN: CPT | Mod: LT,78

## 2018-10-11 PROCEDURE — 88304 TISSUE EXAM BY PATHOLOGIST: CPT

## 2018-10-11 PROCEDURE — 88304 TISSUE EXAM BY PATHOLOGIST: CPT | Mod: 26

## 2018-10-11 PROCEDURE — 93010 ELECTROCARDIOGRAM REPORT: CPT

## 2018-10-11 PROCEDURE — 36415 COLL VENOUS BLD VENIPUNCTURE: CPT

## 2018-10-11 RX ORDER — FUROSEMIDE 40 MG
1 TABLET ORAL
Qty: 0 | Refills: 0 | COMMUNITY

## 2018-10-11 RX ORDER — ALLOPURINOL 300 MG
100 TABLET ORAL DAILY
Qty: 0 | Refills: 0 | Status: DISCONTINUED | OUTPATIENT
Start: 2018-10-11 | End: 2018-10-26

## 2018-10-11 RX ORDER — PRAZOSIN HCL 2 MG
1 CAPSULE ORAL
Qty: 0 | Refills: 0 | COMMUNITY

## 2018-10-11 RX ORDER — SEVELAMER CARBONATE 2400 MG/1
1 POWDER, FOR SUSPENSION ORAL
Qty: 0 | Refills: 0 | COMMUNITY

## 2018-10-11 RX ORDER — RANITIDINE HYDROCHLORIDE 150 MG/1
1 TABLET, FILM COATED ORAL
Qty: 0 | Refills: 0 | COMMUNITY

## 2018-10-11 RX ORDER — AMLODIPINE BESYLATE 2.5 MG/1
1 TABLET ORAL
Qty: 0 | Refills: 0 | COMMUNITY

## 2018-10-11 RX ORDER — FENTANYL CITRATE 50 UG/ML
25 INJECTION INTRAVENOUS
Qty: 0 | Refills: 0 | Status: DISCONTINUED | OUTPATIENT
Start: 2018-10-11 | End: 2018-10-11

## 2018-10-11 RX ORDER — AMLODIPINE BESYLATE 2.5 MG/1
10 TABLET ORAL DAILY
Qty: 0 | Refills: 0 | Status: DISCONTINUED | OUTPATIENT
Start: 2018-10-11 | End: 2018-10-26

## 2018-10-11 RX ORDER — ENOXAPARIN SODIUM 100 MG/ML
8 INJECTION SUBCUTANEOUS
Qty: 0 | Refills: 0 | COMMUNITY

## 2018-10-11 RX ORDER — BENZOYL PEROXIDE MICRONIZED 5.8 %
1 TOWELETTE (EA) TOPICAL
Qty: 0 | Refills: 0 | COMMUNITY

## 2018-10-11 RX ORDER — ISOSORBIDE MONONITRATE 60 MG/1
1 TABLET, EXTENDED RELEASE ORAL
Qty: 0 | Refills: 0 | COMMUNITY

## 2018-10-11 RX ORDER — WARFARIN SODIUM 2.5 MG/1
1 TABLET ORAL
Qty: 0 | Refills: 0 | COMMUNITY

## 2018-10-11 RX ORDER — INSULIN ASPART 100 [IU]/ML
3 INJECTION, SOLUTION SUBCUTANEOUS
Qty: 0 | Refills: 0 | COMMUNITY

## 2018-10-11 RX ORDER — UBIDECARENONE 100 MG
1 CAPSULE ORAL
Qty: 0 | Refills: 0 | COMMUNITY

## 2018-10-11 RX ORDER — CINNAMON BARK 500 MG
1 CAPSULE ORAL
Qty: 0 | Refills: 0 | COMMUNITY

## 2018-10-11 RX ORDER — AMIODARONE HYDROCHLORIDE 400 MG/1
1 TABLET ORAL
Qty: 0 | Refills: 0 | COMMUNITY

## 2018-10-11 RX ORDER — ALLOPURINOL 300 MG
1 TABLET ORAL
Qty: 0 | Refills: 0 | COMMUNITY

## 2018-10-11 RX ORDER — ONDANSETRON 8 MG/1
4 TABLET, FILM COATED ORAL ONCE
Qty: 0 | Refills: 0 | Status: DISCONTINUED | OUTPATIENT
Start: 2018-10-11 | End: 2018-10-11

## 2018-10-11 RX ORDER — SODIUM CHLORIDE 9 MG/ML
1000 INJECTION INTRAMUSCULAR; INTRAVENOUS; SUBCUTANEOUS
Qty: 0 | Refills: 0 | Status: DISCONTINUED | OUTPATIENT
Start: 2018-10-11 | End: 2018-10-11

## 2018-10-11 RX ORDER — FOLIC ACID 0.8 MG
1 TABLET ORAL
Qty: 0 | Refills: 0 | COMMUNITY

## 2018-10-11 RX ORDER — FUROSEMIDE 40 MG
80 TABLET ORAL DAILY
Qty: 0 | Refills: 0 | Status: DISCONTINUED | OUTPATIENT
Start: 2018-10-11 | End: 2018-10-26

## 2018-10-11 RX ORDER — AMIODARONE HYDROCHLORIDE 400 MG/1
200 TABLET ORAL DAILY
Qty: 0 | Refills: 0 | Status: DISCONTINUED | OUTPATIENT
Start: 2018-10-11 | End: 2018-10-26

## 2018-10-11 RX ORDER — FUROSEMIDE 40 MG
80 TABLET ORAL DAILY
Qty: 0 | Refills: 0 | Status: DISCONTINUED | OUTPATIENT
Start: 2018-10-11 | End: 2018-10-11

## 2018-10-11 NOTE — ASU DISCHARGE PLAN (ADULT/PEDIATRIC). - SPECIAL INSTRUCTIONS
If you start to experience increase in pain in left hand, bleeding or draining from incision site, fevers, chills, please call the Vascular office for further instructions.

## 2018-10-11 NOTE — ASU DISCHARGE PLAN (ADULT/PEDIATRIC). - MEDICATION SUMMARY - MEDICATIONS TO TAKE
I will START or STAY ON the medications listed below when I get home from the hospital:    Percocet 5/325 oral tablet  -- 1 tab(s) by mouth every 8 hours, As Needed -for moderate pain MDD:3 tabs   -- Caution federal law prohibits the transfer of this drug to any person other  than the person for whom it was prescribed.  May cause drowsiness.  Alcohol may intensify this effect.  Use care when operating dangerous machinery.  This prescription cannot be refilled.  This product contains acetaminophen.  Do not use  with any other product containing acetaminophen to prevent possible liver damage.  Using more of this medication than prescribed may cause serious breathing problems.    -- Indication: For for pain    enalapril 20 mg oral tablet  -- 1 tab(s) by mouth 2 times a day  -- Indication: For home med    prazosin 1 mg oral capsule  -- 1 cap(s) by mouth 3 times a day  -- Indication: For home med    isosorbide mononitrate 60 mg oral tablet, extended release  -- 1 tab(s) by mouth once a day (in the morning)  -- Indication: For home med    amiodarone 200 mg oral tablet  -- 1 tab(s) by mouth once a day  -- Indication: For home med    warfarin 2 mg oral tablet  -- 1 tab(s) by mouth once a day  -- Indication: For home med    NovoLOG FlexPen 100 units/mL injectable solution  -- 3 unit(s) injectable 3 times a day  -- Indication: For home med    Lantus Solostar Pen 100 units/mL subcutaneous solution  -- 8  subcutaneous once a day (at bedtime)  -- Indication: For home med    allopurinol 100 mg oral tablet  -- 1 tab(s) by mouth once a day  -- Indication: For home med    amLODIPine 10 mg oral tablet  -- 1 tab(s) by mouth once a day  -- Indication: For home med    furosemide 80 mg oral tablet  -- 1 tab(s) by mouth once a day  -- Indication: For home med    raNITIdine 150 mg oral capsule  -- 1 cap(s) by mouth 2 times a day  -- Indication: For home med    Cinnamon 500 mg oral capsule  -- 1 cap(s) by mouth once a day  -- Indication: For home med    Iron-150 oral tablet  -- 1 tab(s) by mouth once a day  -- Indication: For home med    CoQ10 300 mg oral capsule  -- 1 cap(s) by mouth once a day  -- Indication: For home med    glucosamine 500 mg oral tablet  -- 2 tab(s) by mouth once a day  -- Indication: For home med    sevelamer carbonate 800 mg oral tablet  -- 1 tab(s) by mouth 3 times a day (with meals)  -- Indication: For home med    folic acid 0.8 mg oral tablet  -- 1 tab(s) by mouth once a day  -- Indication: For home med I will START or STAY ON the medications listed below when I get home from the hospital:    Percocet 5/325 oral tablet  -- 1 tab(s) by mouth every 8 hours, As Needed -for severe pain  -for moderate pain MDD:3 tabs   -- Caution federal law prohibits the transfer of this drug to any person other  than the person for whom it was prescribed.  May cause drowsiness.  Alcohol may intensify this effect.  Use care when operating dangerous machinery.  This prescription cannot be refilled.  This product contains acetaminophen.  Do not use  with any other product containing acetaminophen to prevent possible liver damage.  Using more of this medication than prescribed may cause serious breathing problems.    -- Indication: For Pain medication    enalapril 20 mg oral tablet  -- 1 tab(s) by mouth 2 times a day  -- Indication: For home med    prazosin 1 mg oral capsule  -- 1 cap(s) by mouth 3 times a day  -- Indication: For home med    isosorbide mononitrate 60 mg oral tablet, extended release  -- 1 tab(s) by mouth once a day (in the morning)  -- Indication: For home med    amiodarone 200 mg oral tablet  -- 1 tab(s) by mouth once a day  -- Indication: For home med    warfarin 2 mg oral tablet  -- 1 tab(s) by mouth once a day  -- Indication: For home med    NovoLOG FlexPen 100 units/mL injectable solution  -- 3 unit(s) injectable 3 times a day  -- Indication: For home med    Lantus Solostar Pen 100 units/mL subcutaneous solution  -- 8  subcutaneous once a day (at bedtime)  -- Indication: For home med    allopurinol 100 mg oral tablet  -- 1 tab(s) by mouth once a day  -- Indication: For home med    amLODIPine 10 mg oral tablet  -- 1 tab(s) by mouth once a day  -- Indication: For home med    furosemide 80 mg oral tablet  -- 1 tab(s) by mouth once a day  -- Indication: For home med    raNITIdine 150 mg oral capsule  -- 1 cap(s) by mouth 2 times a day  -- Indication: For home med    Cinnamon 500 mg oral capsule  -- 1 cap(s) by mouth once a day  -- Indication: For home med    Iron-150 oral tablet  -- 1 tab(s) by mouth once a day  -- Indication: For home med    CoQ10 300 mg oral capsule  -- 1 cap(s) by mouth once a day  -- Indication: For home med    glucosamine 500 mg oral tablet  -- 2 tab(s) by mouth once a day  -- Indication: For home med    sevelamer carbonate 800 mg oral tablet  -- 1 tab(s) by mouth 3 times a day (with meals)  -- Indication: For home med    folic acid 0.8 mg oral tablet  -- 1 tab(s) by mouth once a day  -- Indication: For home med

## 2018-10-11 NOTE — BRIEF OPERATIVE NOTE - PROCEDURE
<<-----Click on this checkbox to enter Procedure Arteriovenous fistula repair  10/11/2018  Revision of left arteriovenous fistula  Active  [facility]

## 2018-10-17 LAB — SURGICAL PATHOLOGY FINAL REPORT - CH: SIGNIFICANT CHANGE UP

## 2018-11-06 ENCOUNTER — APPOINTMENT (OUTPATIENT)
Dept: DERMATOLOGY | Facility: CLINIC | Age: 78
End: 2018-11-06

## 2018-11-19 PROBLEM — I77.0 AVF (ARTERIOVENOUS FISTULA): Status: ACTIVE | Noted: 2018-09-11

## 2018-11-26 ENCOUNTER — APPOINTMENT (OUTPATIENT)
Dept: DERMATOLOGY | Facility: CLINIC | Age: 78
End: 2018-11-26

## 2018-12-18 ENCOUNTER — APPOINTMENT (OUTPATIENT)
Dept: DERMATOLOGY | Facility: CLINIC | Age: 78
End: 2018-12-18

## 2023-07-07 NOTE — ASU PATIENT PROFILE, ADULT - PAIN CHRONIC, PROFILE
yes Terbinafine Pregnancy And Lactation Text: This medication is Pregnancy Category B and is considered safe during pregnancy. It is also excreted in breast milk and breast feeding isn't recommended.

## 2024-03-31 NOTE — BRIEF OPERATIVE NOTE - POST-OP DX
[FreeTextEntry1] : Patient states he is doing okay since last visit. States after holidays mother fell and the sisters help very little.  Patient states his sleep and appetite are the same as last time and reported that he is not feeling depressed. He reported anxiety symptoms also remain in good control.  No new medical issues, no new medication since last visit He states mom is doing good. 
ESRD (end stage renal disease)  10/11/2018    Active  [facility]